# Patient Record
Sex: MALE | Race: OTHER | URBAN - METROPOLITAN AREA
[De-identification: names, ages, dates, MRNs, and addresses within clinical notes are randomized per-mention and may not be internally consistent; named-entity substitution may affect disease eponyms.]

---

## 2017-02-22 ENCOUNTER — EMERGENCY (EMERGENCY)
Facility: HOSPITAL | Age: 27
LOS: 1 days | Discharge: HOME | End: 2017-02-22
Attending: EMERGENCY MEDICINE | Admitting: EMERGENCY MEDICINE
Payer: MEDICAID

## 2017-02-22 VITALS
SYSTOLIC BLOOD PRESSURE: 110 MMHG | TEMPERATURE: 98 F | RESPIRATION RATE: 16 BRPM | OXYGEN SATURATION: 96 % | DIASTOLIC BLOOD PRESSURE: 72 MMHG | HEART RATE: 66 BPM

## 2017-02-22 DIAGNOSIS — Z76.0 ENCOUNTER FOR ISSUE OF REPEAT PRESCRIPTION: ICD-10-CM

## 2017-02-22 DIAGNOSIS — R45.4 IRRITABILITY AND ANGER: ICD-10-CM

## 2017-02-22 DIAGNOSIS — R45.1 RESTLESSNESS AND AGITATION: ICD-10-CM

## 2017-02-22 DIAGNOSIS — Z79.899 OTHER LONG TERM (CURRENT) DRUG THERAPY: ICD-10-CM

## 2017-02-22 PROCEDURE — 99283 EMERGENCY DEPT VISIT LOW MDM: CPT

## 2017-02-22 RX ORDER — CLONAZEPAM 1 MG
2 TABLET ORAL
Qty: 0 | Refills: 0 | COMMUNITY

## 2017-02-22 RX ORDER — MIRTAZAPINE 45 MG/1
0 TABLET, ORALLY DISINTEGRATING ORAL
Qty: 0 | Refills: 0 | COMMUNITY

## 2017-02-22 RX ORDER — ARIPIPRAZOLE 15 MG/1
1 TABLET ORAL
Qty: 0 | Refills: 0 | COMMUNITY

## 2017-02-22 RX ORDER — DIVALPROEX SODIUM 500 MG/1
250 TABLET, DELAYED RELEASE ORAL ONCE
Qty: 0 | Refills: 0 | Status: COMPLETED | OUTPATIENT
Start: 2017-02-22 | End: 2017-02-22

## 2017-02-22 RX ORDER — DIVALPROEX SODIUM 500 MG/1
1 TABLET, DELAYED RELEASE ORAL
Qty: 0 | Refills: 0 | COMMUNITY

## 2017-02-22 RX ORDER — ARIPIPRAZOLE 15 MG/1
30 TABLET ORAL ONCE
Qty: 0 | Refills: 0 | Status: COMPLETED | OUTPATIENT
Start: 2017-02-22 | End: 2017-02-22

## 2017-02-22 RX ADMIN — ARIPIPRAZOLE 30 MILLIGRAM(S): 15 TABLET ORAL at 09:52

## 2017-02-22 RX ADMIN — DIVALPROEX SODIUM 250 MILLIGRAM(S): 500 TABLET, DELAYED RELEASE ORAL at 09:52

## 2017-02-22 NOTE — ED PROVIDER NOTE - NS ED ROS FT
Gen - no fever, chills, malaise, weight loss, generalized weakness   Skin - no rash, pruritus, flushing   HEENT - no HA, change in vision/hearing, tinnitus, neck pain, stiffness   CV - no CP, palpitations, diaphoresis, orthopnea  Resp - no SOB, wheezing, cough, hemoptysis   GI - no N/V/D/C, abdominal pain, bloating, hematochezia, melena  MS - no stiffness, weakness, arthralgia, or myalgia   Hem/Onc - no abnormal bleeding or bruising   Neuro - no dizziness, numbness, tingling, paresthesia, focal weakness, diplopia, or gait disturbance   Psych - +agitation and anxiety, no SI/HI/AH/VH/delusion

## 2017-02-22 NOTE — ED PROVIDER NOTE - OBJECTIVE STATEMENT
27 yo M with PMHx of schizophrenia, PTSD, anxiety 27 yo M with PMHx of schizophrenia, PTSD, anxiety, presenting for medication refill. Pt reports being off his psych meds x few days due to moving between shelters, was on abilify 30mg daily, depakote 250mg daily, and klonopin 2mg, missed his curfew at the shelter and got agitated and angry.  Desires his current dose of meds to calm him down.  Has appt with his psychiatrist in a few days. Denies SI/HI/AH/VH, trauma, fall, HA, dizziness, fever, chills, N/V, CP, SOB, palpitations, and focal weakness

## 2017-02-22 NOTE — ED PROVIDER NOTE - PHYSICAL EXAMINATION
Gen - NAD, comfortable in stretcher,  non-toxic appearing  Skin - warm, dry, intact   CV - S1S2, R/R/R  Resp - CTAB, no r/r/w   GI - soft, ND, NT  MS - w/w/p, no c/c/e, no CVAT b/l   Psych - dysphoric and somnolent appearing, arousable to verbali stimuli, AxOx3, no SI/HI/AH/VH/delusion
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
## 2017-02-22 NOTE — ED PROVIDER NOTE - MEDICAL DECISION MAKING DETAILS
[facility] at time of d/c, pt non-toxic appearing and hemodynamically stable, given home dose of antipsychotic meds in the ED, medically and psychiatrically stable for d/c to f/u with outpt psych

## 2019-03-11 NOTE — ED ADULT TRIAGE NOTE - HEART RATE (BEATS/MIN)
"-- Message is from the "[employer]--    Patient is requesting a medication refill - medication is on active list.    Message: Also, patient would like to ensure that the pharmacy has been submitting their request for this medication to Dr. Brianne Preciado. Rx:   losartan (COZAAR) 100 MG tablet 100 mg, Oral         Duration: 30 days    Pharmacy  [facility] [address]    Patient confirmed the above pharmacy as correct? Yes    Caller Information       Type Contact Phone    03/08/2019 12:35 PM Phone (Incoming) Ariana Marion (Self) [phone] (H)          Alternative phone number: n/a    Turnaround time given to caller: ""This message will be sent to [facility] Provider's name]. The clinical team will fulfill your request as soon as they review your message. \""  "
Called pharmacy, received rx from 03/09/19.  Rx ready for pt to   Called pt no answer & left detailed message
Verified in [employer], rx as history.  Never prescribed please advise
68